# Patient Record
Sex: FEMALE | Race: WHITE | NOT HISPANIC OR LATINO | Employment: FULL TIME | URBAN - METROPOLITAN AREA
[De-identification: names, ages, dates, MRNs, and addresses within clinical notes are randomized per-mention and may not be internally consistent; named-entity substitution may affect disease eponyms.]

---

## 2019-05-15 PROBLEM — Z34.80 SUPERVISION OF OTHER NORMAL PREGNANCY, ANTEPARTUM: Status: ACTIVE | Noted: 2019-05-15

## 2019-07-24 PROBLEM — Z34.80 SUPERVISION OF OTHER NORMAL PREGNANCY, ANTEPARTUM: Status: RESOLVED | Noted: 2019-05-15 | Resolved: 2019-07-01

## 2021-05-05 PROBLEM — Z34.82 PRENATAL CARE, SUBSEQUENT PREGNANCY, SECOND TRIMESTER: Status: ACTIVE | Noted: 2021-05-05

## 2021-06-30 PROBLEM — O09.529 ADVANCED MATERNAL AGE IN MULTIGRAVIDA: Status: ACTIVE | Noted: 2021-06-30

## 2021-07-06 PROBLEM — E55.9 VITAMIN D DEFICIENCY: Status: ACTIVE | Noted: 2021-07-06

## 2021-07-06 PROBLEM — D64.9 ANEMIA: Status: ACTIVE | Noted: 2021-07-06

## 2021-08-18 PROBLEM — O12.03 EDEMA DURING PREGNANCY IN THIRD TRIMESTER: Status: ACTIVE | Noted: 2021-08-18

## 2021-10-12 PROBLEM — Z34.90 ENCOUNTER FOR ELECTIVE INDUCTION OF LABOR: Status: ACTIVE | Noted: 2021-10-12

## 2021-10-22 PROBLEM — F41.8 POSTPARTUM ANXIETY: Status: ACTIVE | Noted: 2021-10-22

## 2021-11-24 PROBLEM — O09.529 ADVANCED MATERNAL AGE IN MULTIGRAVIDA: Status: RESOLVED | Noted: 2021-06-30 | Resolved: 2021-11-24

## 2021-11-24 PROBLEM — Z34.82 PRENATAL CARE, SUBSEQUENT PREGNANCY, SECOND TRIMESTER: Status: RESOLVED | Noted: 2021-05-05 | Resolved: 2021-11-24

## 2021-11-24 PROBLEM — O12.03 EDEMA DURING PREGNANCY IN THIRD TRIMESTER: Status: RESOLVED | Noted: 2021-08-18 | Resolved: 2021-11-24

## 2021-11-24 PROBLEM — Z34.90 ENCOUNTER FOR ELECTIVE INDUCTION OF LABOR: Status: RESOLVED | Noted: 2021-10-12 | Resolved: 2021-11-24

## 2022-03-21 PROBLEM — Z34.80 SUPERVISION OF OTHER NORMAL PREGNANCY, ANTEPARTUM: Status: ACTIVE | Noted: 2022-03-21

## 2022-03-21 PROBLEM — F41.8 POSTPARTUM ANXIETY: Status: RESOLVED | Noted: 2021-10-22 | Resolved: 2022-03-21

## 2022-07-05 PROBLEM — U07.1 COVID-19 AFFECTING PREGNANCY IN SECOND TRIMESTER: Status: ACTIVE | Noted: 2022-07-05

## 2022-07-05 PROBLEM — O98.512 COVID-19 AFFECTING PREGNANCY IN SECOND TRIMESTER: Status: ACTIVE | Noted: 2022-07-05

## 2022-08-18 PROBLEM — O24.419 GESTATIONAL DIABETES MELLITUS (GDM) IN THIRD TRIMESTER: Status: ACTIVE | Noted: 2022-08-18

## 2022-10-07 PROBLEM — Z34.90 ENCOUNTER FOR INDUCTION OF LABOR: Status: ACTIVE | Noted: 2022-10-07

## 2022-10-08 PROBLEM — Z34.90 ENCOUNTER FOR INDUCTION OF LABOR: Status: RESOLVED | Noted: 2022-10-07 | Resolved: 2022-10-08

## 2022-11-15 PROBLEM — Z34.80 SUPERVISION OF OTHER NORMAL PREGNANCY, ANTEPARTUM: Status: RESOLVED | Noted: 2022-03-21 | Resolved: 2022-11-15

## 2022-11-15 PROBLEM — O98.512 COVID-19 AFFECTING PREGNANCY IN SECOND TRIMESTER: Status: RESOLVED | Noted: 2022-07-05 | Resolved: 2022-11-15

## 2022-11-15 PROBLEM — U07.1 COVID-19 AFFECTING PREGNANCY IN SECOND TRIMESTER: Status: RESOLVED | Noted: 2022-07-05 | Resolved: 2022-11-15

## 2024-02-15 ENCOUNTER — OFFICE VISIT (OUTPATIENT)
Dept: DERMATOLOGY | Facility: IMAGING CENTER | Age: 39
End: 2024-02-15
Payer: COMMERCIAL

## 2024-02-15 DIAGNOSIS — Z12.83 SKIN CANCER SCREENING: ICD-10-CM

## 2024-02-15 DIAGNOSIS — L81.4 LENTIGINES: ICD-10-CM

## 2024-02-15 DIAGNOSIS — L30.9 HAND DERMATITIS: ICD-10-CM

## 2024-02-15 DIAGNOSIS — D22.9 MULTIPLE NEVI: ICD-10-CM

## 2024-02-15 DIAGNOSIS — L82.1 SK (SEBORRHEIC KERATOSIS): ICD-10-CM

## 2024-02-15 DIAGNOSIS — L90.0 LICHEN SCLEROSUS: ICD-10-CM

## 2024-02-15 DIAGNOSIS — D18.01 CHERRY ANGIOMA: ICD-10-CM

## 2024-02-15 PROCEDURE — 99204 OFFICE O/P NEW MOD 45 MIN: CPT | Performed by: NURSE PRACTITIONER

## 2024-02-15 RX ORDER — CLOBETASOL PROPIONATE 0.5 MG/G
OINTMENT TOPICAL
Qty: 45 G | Refills: 3 | Status: SHIPPED | OUTPATIENT
Start: 2024-02-15

## 2024-02-15 NOTE — PROGRESS NOTES
"DERMATOLOGY NOTE  NEW VISIT       Chief complaint: Establish Care (zeyad) and Rash     HPI: \"bumps\" lt palm and labia   Onset: 5 yrs   Symptoms: dry itchy    Aggravating factors: no  Alleviating factors: no   Treatments: otc hydrocortisone     History of skin cancer: No  History of precancers/actinic keratoses: No  History of biopsies:Yes, Details: benign birth sg   History of blistering/severe sunburns:No  Family history of skin cancer:No  Family history of atypical moles:No    No Known Allergies     MEDICATIONS:  Medications relevant to specialty reviewed.     REVIEW OF SYSTEMS:   Positive for skin (see HPI)  Negative for fevers and chills    EXAM:  There were no vitals taken for this visit.  Constitutional: Well-developed, well-nourished, and in no distress.     A total body skin exam was performed excluding the genitals per patient preference and including the following areas: head (including face), neck, chest, abdomen, groin/buttocks, back, bilateral upper extremities, and bilateral lower extremities with the following pertinent findings listed below and/or in assessment/plan.     -sun exposed skin of trunk and b/l upper, lower extremities and face with scattered clinically benign light brown reticulated macules all of which were morphologically similar and none of which were suspicious for skin cancer today on exam  -Several scattered 1-3mm bright red macules and thin papules on the trunk and extremities  -Multiple tan medium and dark brown skin-colored macules papules scattered over the trunk, face and extremities--All with benign-appearing pigment network patterns on dermoscopy  -Localized eczematous patch to palm of hand  - atrophic changes to bilateral labia with mild erythema     IMPRESSION / PLAN:    1. Lentigines  - Benign-appearing nature of lesions discussed during exam.   - Advised to continue to monitor for any return to clinic for new or concerning changes.      2. Cherry angioma  - " Benign-appearing nature of lesions discussed during exam.   - Advised to continue to monitor for any return to clinic for new or concerning changes.      3. Multiple nevi  - Benign-appearing nature of lesions discussed during exam.   - Advised to continue to monitor for any return to clinic for new or concerning changes.  - ABCDE's of melanoma discussed/handout given      4. SK (seborrheic keratosis)  - Benign-appearing nature of lesions discussed during exam.   - Advised to continue to monitor for any return to clinic for new or concerning changes.      5. Lichen sclerosus  Discussed course/nature of disease  Rx below  Close follow up 6 weeks  - clobetasol (TEMOVATE) 0.05 % Ointment; AAA, labia twice daily 2-3 days per week, L hand twice daily for 2-3 weeks at a time with 1-2 week break in between  Dispense: 45 g; Refill: 3    6. Skin cancer screening  Skin cancer education  discussed importance of sun protective clothing, eyewear in addition to the use of broad spectrum sunscreen with SPF 30 or greater, as well as need for reapplication ~every 2 hours when exposed to UVR/handout given  discussed importance following up for any new or changing lesions as noted in handout given, but every 12 months exams in clinic in the setting of dermatologic history  ABCDE's of melanoma discussed/handout given      7. Hand dermatitis  Rx below  Advised use of emollients  Follow up 6 weeks  - clobetasol (TEMOVATE) 0.05 % Ointment; AAA, labia twice daily 2-3 days per week, L hand twice daily for 2-3 weeks at a time with 1-2 week break in between  Dispense: 45 g; Refill: 3      Patient verbalized understanding and agrees with plan regarding the above      Please note that this dictation was created using voice recognition software. I have made every reasonable attempt to correct obvious errors, but I expect that there are errors of grammar and possibly content that I did not discover before finalizing the note.      Return to clinic  in: Return in about 6 weeks (around 3/28/2024) for Rash follow up, OTILIO 1 year. and as needed for any new or changing skin lesions.

## 2025-08-15 DIAGNOSIS — Z00.6 CLINICAL TRIAL PARTICIPANT: ICD-10-CM
